# Patient Record
Sex: MALE | Race: BLACK OR AFRICAN AMERICAN | Employment: FULL TIME | URBAN - NONMETROPOLITAN AREA
[De-identification: names, ages, dates, MRNs, and addresses within clinical notes are randomized per-mention and may not be internally consistent; named-entity substitution may affect disease eponyms.]

---

## 2020-06-12 ENCOUNTER — HOSPITAL ENCOUNTER (EMERGENCY)
Age: 30
Discharge: HOME | End: 2020-06-12
Attending: EMERGENCY MEDICINE

## 2020-06-12 VITALS
TEMPERATURE: 97.7 F | DIASTOLIC BLOOD PRESSURE: 74 MMHG | SYSTOLIC BLOOD PRESSURE: 147 MMHG | HEART RATE: 88 BPM | OXYGEN SATURATION: 97 % | WEIGHT: 183 LBS | RESPIRATION RATE: 16 BRPM

## 2020-06-12 PROCEDURE — 87591 N.GONORRHOEAE DNA AMP PROB: CPT

## 2020-06-12 PROCEDURE — 6370000000 HC RX 637 (ALT 250 FOR IP): Performed by: EMERGENCY MEDICINE

## 2020-06-12 PROCEDURE — 6360000002 HC RX W HCPCS: Performed by: EMERGENCY MEDICINE

## 2020-06-12 PROCEDURE — 99203 OFFICE O/P NEW LOW 30 MIN: CPT

## 2020-06-12 PROCEDURE — 2500000003 HC RX 250 WO HCPCS: Performed by: EMERGENCY MEDICINE

## 2020-06-12 PROCEDURE — 99203 OFFICE O/P NEW LOW 30 MIN: CPT | Performed by: EMERGENCY MEDICINE

## 2020-06-12 PROCEDURE — 96372 THER/PROPH/DIAG INJ SC/IM: CPT

## 2020-06-12 PROCEDURE — 87491 CHLMYD TRACH DNA AMP PROBE: CPT

## 2020-06-12 RX ORDER — AZITHROMYCIN 250 MG/1
1000 TABLET, FILM COATED ORAL ONCE
Status: COMPLETED | OUTPATIENT
Start: 2020-06-12 | End: 2020-06-12

## 2020-06-12 RX ADMIN — LIDOCAINE HYDROCHLORIDE 250 MG: 10 INJECTION, SOLUTION EPIDURAL; INFILTRATION; INTRACAUDAL; PERINEURAL at 19:24

## 2020-06-12 RX ADMIN — AZITHROMYCIN MONOHYDRATE 1000 MG: 250 TABLET ORAL at 19:24

## 2020-06-12 ASSESSMENT — ENCOUNTER SYMPTOMS
DIARRHEA: 0
BACK PAIN: 0
EYE REDNESS: 0
SORE THROAT: 0
STRIDOR: 0
VOMITING: 0
WHEEZING: 0
EYE PAIN: 0
VOICE CHANGE: 0
COUGH: 0
SINUS PRESSURE: 0
EYE DISCHARGE: 0
NAUSEA: 0
TROUBLE SWALLOWING: 0
SHORTNESS OF BREATH: 0
ABDOMINAL PAIN: 0

## 2020-06-12 NOTE — ED PROVIDER NOTES
[facility] Encounter      [address] [facility]       Chief Complaint   Patient presents with    Exposure to STD     no ssymptom girlfriend got checked for sx. Nurses Notes reviewed and I agree except as noted in the HPI. HISTORY OF PRESENT ILLNESS   Haydee Ramírez is a 34 y.o. male who presents for evaluation of STD exposure. He is asymptomatic. Patient has had unprotected intercourse with 1 female. She received Rocephin and Zithromax but states she did not have an STD. She is requesting Rodrick Mccoy be tested and treated. Patient denies fever, vomiting, back pain, abdominal pain, dysuria, penile discharge, conjunctivitis, joint pain, rash. Patient states HIV is negative. Smokes cigarettes. Patient requesting follow-up information for health department STD clinic    REVIEW OF SYSTEMS     Review of Systems   Constitutional: Negative for appetite change, chills, fatigue, fever and unexpected weight change. HENT: Negative for congestion, ear discharge, ear pain, sinus pressure, sneezing, sore throat, trouble swallowing and voice change. Eyes: Negative for pain, discharge and redness. Respiratory: Negative for cough, shortness of breath, wheezing and stridor. Cardiovascular: Negative for chest pain and leg swelling. Gastrointestinal: Negative for abdominal pain, diarrhea, nausea and vomiting. Genitourinary: Negative for dysuria, frequency, hematuria and urgency. STD exposure no symptoms   Musculoskeletal: Negative for arthralgias, back pain, myalgias and neck pain. Skin: Negative for rash. Neurological: Negative for dizziness, syncope, weakness and headaches. Hematological: Negative for adenopathy. Psychiatric/Behavioral: Negative for behavioral problems, confusion, sleep disturbance and suicidal ideas. The patient is not nervous/anxious. All other systems reviewed and are negative. PAST MEDICAL HISTORY   History reviewed.  No pertinent past murmur. No friction rub. No gallop. Pulmonary:      Effort: Pulmonary effort is normal. No respiratory distress. Breath sounds: Normal breath sounds. No stridor. No decreased breath sounds, wheezing, rhonchi or rales. Comments: No cough, lungs clear  Chest:      Chest wall: No tenderness. Abdominal:      General: Bowel sounds are normal. There is no distension. Palpations: Abdomen is soft. There is no mass. Tenderness: There is no abdominal tenderness. There is no right CVA tenderness, left CVA tenderness, guarding or rebound. Comments: Soft nontender   Genitourinary:     Comments: Deferred  Musculoskeletal: Normal range of motion. General: No tenderness. Comments: Joints normal   Lymphadenopathy:      Cervical: Cervical adenopathy present. Right cervical: Superficial cervical adenopathy present. No deep or posterior cervical adenopathy. Left cervical: Superficial cervical adenopathy present. No deep or posterior cervical adenopathy. Skin:     General: Skin is warm and dry. Findings: No erythema or rash. Comments: No rash or bruising   Neurological:      Mental Status: He is alert and oriented to person, place, and time. Cranial Nerves: No cranial nerve deficit. Motor: No abnormal muscle tone. Coordination: Coordination normal.      Deep Tendon Reflexes: Reflexes are normal and symmetric. Reflexes normal.      Comments: Appropriate, no focal finding   Psychiatric:         Behavior: Behavior normal.         Thought Content: Thought content normal.         Judgment: Judgment normal.         DIAGNOSTIC RESULTS   Labs: No results found for this visit on 06/12/20.     IMAGING:  No orders to display     URGENT CARE COURSE:     Vitals:    06/12/20 1847   BP: (!) 147/74   Pulse: 88   Resp: 16   Temp: 97.7 °F (36.5 °C)   TempSrc: Temporal   SpO2: 97%   Weight: 183 lb (83 kg)       Medications   cefTRIAXone (ROCEPHIN) 250 mg in lidocaine 1 % 1 mL IM Injection (has no administration in time range)   azithromycin (ZITHROMAX) tablet 1,000 mg (has no administration in time range)   Tolerated well  PROCEDURES:  None  FINALIMPRESSION      1. Exposure to STD        DISPOSITION/PLAN   DISPOSITION    Nontoxic, well-hydrated, normal airway. No sepsis or CNS infection. Abdomen nonsurgical.  Patient asymptomatic. STD testing performed. Patient received Rocephin IM and Zithromax 1000 mg p.o. and tolerated well. Patient to follow-up with health department for further testing and to review test results. He was instructed to maintain sexual abstinence or safe sex practices with written instructions for STIs sent home with patient. PATIENT REFERRED TO:  Follow-up with health department for test results and further testing    Schedule an appointment as soon as possible for a visit in 5 days  Follow-up with health department for test results and further testing    DISCHARGE MEDICATIONS:  New Prescriptions    No medications on file     There are no discharge medications for this patient.       MD Loenard Stewart MD  06/12/20 2001 [address] Flavio Bryan MD  06/12/20 Cyn Reese

## 2020-06-13 LAB
CHLAMYDIA TRACHOMATIS BY RT-PCR: NOT DETECTED
CT/NG SOURCE: NORMAL
NEISSERIA GONORRHOEAE BY RT-PCR: NOT DETECTED

## 2021-06-15 ENCOUNTER — APPOINTMENT (OUTPATIENT)
Dept: GENERAL RADIOLOGY | Age: 31
End: 2021-06-15
Payer: COMMERCIAL

## 2021-06-15 ENCOUNTER — HOSPITAL ENCOUNTER (EMERGENCY)
Age: 31
Discharge: HOME | End: 2021-06-15
Payer: COMMERCIAL

## 2021-06-15 VITALS
OXYGEN SATURATION: 99 % | HEART RATE: 99 BPM | TEMPERATURE: 98.1 F | SYSTOLIC BLOOD PRESSURE: 138 MMHG | WEIGHT: 180 LBS | RESPIRATION RATE: 12 BRPM | DIASTOLIC BLOOD PRESSURE: 91 MMHG

## 2021-06-15 DIAGNOSIS — S42.401A ELBOW FRACTURE, RIGHT, CLOSED, INITIAL ENCOUNTER: Primary | ICD-10-CM

## 2021-06-15 PROCEDURE — 99213 OFFICE O/P EST LOW 20 MIN: CPT | Performed by: EMERGENCY MEDICINE

## 2021-06-15 PROCEDURE — 6370000000 HC RX 637 (ALT 250 FOR IP): Performed by: EMERGENCY MEDICINE

## 2021-06-15 PROCEDURE — 73080 X-RAY EXAM OF ELBOW: CPT

## 2021-06-15 PROCEDURE — 99213 OFFICE O/P EST LOW 20 MIN: CPT

## 2021-06-15 PROCEDURE — 29105 APPLICATION LONG ARM SPLINT: CPT

## 2021-06-15 RX ORDER — IBUPROFEN 200 MG
200 TABLET ORAL EVERY 6 HOURS PRN
COMMUNITY

## 2021-06-15 RX ORDER — IBUPROFEN 800 MG/1
800 TABLET ORAL ONCE
Status: COMPLETED | OUTPATIENT
Start: 2021-06-15 | End: 2021-06-15

## 2021-06-15 RX ORDER — IBUPROFEN 800 MG/1
800 TABLET ORAL EVERY 6 HOURS PRN
Qty: 30 TABLET | Refills: 0 | Status: SHIPPED | OUTPATIENT
Start: 2021-06-15

## 2021-06-15 RX ADMIN — IBUPROFEN 800 MG: 800 TABLET, FILM COATED ORAL at 16:32

## 2021-06-15 ASSESSMENT — ENCOUNTER SYMPTOMS
SHORTNESS OF BREATH: 0
BACK PAIN: 0
COUGH: 0

## 2021-06-15 ASSESSMENT — PAIN SCALES - GENERAL
PAINLEVEL_OUTOF10: 10
PAINLEVEL_OUTOF10: 10

## 2021-06-15 ASSESSMENT — PAIN DESCRIPTION - ORIENTATION: ORIENTATION: RIGHT

## 2021-06-15 ASSESSMENT — PAIN DESCRIPTION - LOCATION: LOCATION: ELBOW

## 2021-06-15 NOTE — ED PROVIDER NOTES
[place] [address]  Urgent Care Encounter       CHIEF COMPLAINT       Chief Complaint   Patient presents with    Fall     C/O RIGHT ELBOW PAIN  AT HOME       Nurses Notes reviewed and I agree except as noted in the HPI. HISTORY OF PRESENT ILLNESS   Manan Mtz is a 27 y.o. male who presents for complaints of right elbow injury. Patient states that he was running and he fell. He landed on his bent elbow. Patient states this hit concrete. Incident happened approximately 3 hours prior to arrival.  Patient states he is having difficulty extending his elbow straight. Pain is on the lateral surface of the elbow and currently rates his pain 10 on a 10 scale. Patient hasn't applied ice or taken anything for his pain. Patient denies any other injuries from today's fall. HPI    REVIEW OF SYSTEMS     Review of Systems   Constitutional: Negative for fatigue and fever. Respiratory: Negative for cough and shortness of breath. Musculoskeletal: Positive for arthralgias. Negative for back pain, gait problem, neck pain and neck stiffness. Skin: Negative for wound. Neurological: Negative for dizziness and light-headedness. Psychiatric/Behavioral: Negative for behavioral problems. PAST MEDICAL HISTORY   No past medical history on file. SURGICALHISTORY     Patient  has no past surgical history on file. CURRENT MEDICATIONS       Previous Medications    IBUPROFEN (ADVIL;MOTRIN) 200 MG TABLET    Take 200 mg by mouth every 6 hours as needed for Pain       ALLERGIES     Patient is has No Known Allergies. Patients   There is no immunization history on file for this patient. FAMILY HISTORY     Patient's family history is not on file. SOCIAL HISTORY     Patient  reports that he quit smoking about 4 months ago. His smoking use included cigarettes. He smoked 0.50 packs per day. He has never used smokeless tobacco. He reports previous alcohol use.  He reports previous drug use.    PHYSICAL EXAM     ED TRIAGE VITALS  BP: (!) 138/91, Temp: 98.1 °F (36.7 °C), Pulse: 99, Resp: 12, SpO2: 99 %,There is no height or weight on file to calculate BMI.,No LMP for male patient. Physical Exam  Constitutional:       General: He is not in acute distress. Appearance: He is normal weight. He is not ill-appearing. HENT:      Head: Normocephalic. Nose: Nose normal.   Cardiovascular:      Rate and Rhythm: Normal rate. Pulses: Normal pulses. Pulmonary:      Effort: Pulmonary effort is normal.      Breath sounds: Normal breath sounds. Musculoskeletal:      Right forearm: Swelling, tenderness and bony tenderness present. Arms:       Comments: Patient has redness on the lateral surface of the right elbow extending from the distal humerus to the proximal forearm. Patient is able to flex the elbow but has difficulty fully extending the elbow. Skin:     General: Skin is warm and dry. Capillary Refill: Capillary refill takes less than 2 seconds. Neurological:      General: No focal deficit present. Mental Status: He is alert and oriented to person, place, and time. Sensory: Sensation is intact. Motor: No abnormal muscle tone. Psychiatric:         Mood and Affect: Mood normal.         Behavior: Behavior normal.         DIAGNOSTIC RESULTS     Labs:No results found for this visit on 06/15/21. IMAGING:    XR ELBOW RIGHT (MIN 3 VIEWS)   Final Result    Questionable joint effusion without definite fracture identified. Joint effusion can be associated with occult radial head fracture. Consider conservative management and repeat imaging in one week. **This report has been created using voice recognition software. It may contain minor errors which are inherent in voice recognition technology. **      Final report electronically signed by Dr. Vida Rush MD on 6/15/2021 4:14 PM            EKG:      URGENT CARE COURSE:     Vitals:    06/15/21 1540   BP: (!) 138/91   Pulse: 99   Resp: 12   Temp: 98.1 °F (36.7 °C)   SpO2: 99%   Weight: 180 lb (81.6 kg)       Medications   ibuprofen (ADVIL;MOTRIN) tablet 800 mg (800 mg Oral Given 6/15/21 1632)            PROCEDURES:  None    FINAL IMPRESSION      1. Elbow fracture, right, closed, initial encounter          DISPOSITION/ PLAN     X-rays were performed of the right elbow and show questionable joint effusion. There is concern for occult radial head fracture. Patient is placed in long-arm posterior splint. An orthopedic appointment is scheduled for tomorrow at 12:50 PM.  He is administered ibuprofen 800 mg in the urgent care. He will be discharged home. Advised to ice the area as frequently as possible. Wear the splint and sling until seen by orthopedics. Ibuprofen as directed as needed for pain. Return for worsening swelling, numbness or tingling to the fingers, delayed capillary refill or any new concerns. PATIENT REFERRED TO:  No primary care provider on file. No primary physician on file.       DISCHARGE MEDICATIONS:  New Prescriptions    IBUPROFEN (ADVIL;MOTRIN) 800 MG TABLET    Take 1 tablet by mouth every 6 hours as needed for Pain       Discontinued Medications    No medications on file       Current Discharge Medication List          KENIA Jon CNP    (Please note that portions of this note were completed with a voice recognition program. Efforts were made to edit the dictations but occasionally words are mis-transcribed.)          KENIA Jon CNP  06/15/21 [id number]

## 2021-06-15 NOTE — ED TRIAGE NOTES
To room 1 judith c/o right elbow pain s/p fall at home. Redness noted. Pt reports he got 15 stitches yeseterday at [facility] ER for  A laceration to buttocks.   Cut on sink in bathroom

## 2022-12-26 ENCOUNTER — HOSPITAL ENCOUNTER (EMERGENCY)
Age: 32
Discharge: HOME | End: 2022-12-26
Attending: STUDENT IN AN ORGANIZED HEALTH CARE EDUCATION/TRAINING PROGRAM
Payer: MEDICAID

## 2022-12-26 VITALS
BODY MASS INDEX: 26.48 KG/M2 | HEART RATE: 89 BPM | RESPIRATION RATE: 14 BRPM | OXYGEN SATURATION: 100 % | TEMPERATURE: 98.2 F | SYSTOLIC BLOOD PRESSURE: 155 MMHG | HEIGHT: 70 IN | DIASTOLIC BLOOD PRESSURE: 79 MMHG | WEIGHT: 185 LBS

## 2022-12-26 DIAGNOSIS — U07.1 COVID-19: Primary | ICD-10-CM

## 2022-12-26 LAB
INFLUENZA A: NOT DETECTED
INFLUENZA B: NOT DETECTED
SARS-COV-2 RNA, RT PCR: DETECTED

## 2022-12-26 PROCEDURE — 87636 SARSCOV2 & INF A&B AMP PRB: CPT

## 2022-12-26 PROCEDURE — 99283 EMERGENCY DEPT VISIT LOW MDM: CPT

## 2022-12-26 ASSESSMENT — ENCOUNTER SYMPTOMS
ABDOMINAL DISTENTION: 0
DIARRHEA: 0
VOICE CHANGE: 0
SORE THROAT: 0
SHORTNESS OF BREATH: 0
NAUSEA: 0
VOMITING: 0
COUGH: 1
TROUBLE SWALLOWING: 0

## 2022-12-26 ASSESSMENT — PAIN - FUNCTIONAL ASSESSMENT: PAIN_FUNCTIONAL_ASSESSMENT: NONE - DENIES PAIN

## 2022-12-26 NOTE — ED PROVIDER NOTES
[facility]          Pt Name: Zainab Herbert  MRN: [id number]  [place] 1990  Date of evaluation: 12/26/2022  Treating Resident Physician: Josh Webster MD  Supervising Physician: Staica Whaley MD    CHIEF COMPLAINT       Chief Complaint   Patient presents with    Pharyngitis     History obtained from the patient. HISTORY OF PRESENT ILLNESS    HPI  Zainab Herbert is a 28 y.o. male with no significant pmhx who presents to the emergency department for evaluation of productive cough. Patient to ED due to chief complaint of 2-day history of productive cough. States that whenever he coughs, he has green sputum. Patient also complaining of a slight tickle in his throat. Denies any sore throat but states that upon awakening he feels a tickle that goes away shortly after. Denies any fever, chills nausea or vomiting or diarrhea. Patient denies any sick contacts. Patient states that he does not feel that sick but his partner made him come to the ED for further evaluation. The patient has no other acute complaints at this time. REVIEW OF SYSTEMS   Review of Systems   Constitutional:  Negative for chills and fever. HENT:  Negative for congestion, sore throat, trouble swallowing and voice change. Respiratory:  Positive for cough. Negative for shortness of breath. Gastrointestinal:  Negative for abdominal distention, diarrhea, nausea and vomiting. Neurological:  Negative for dizziness, light-headedness and headaches. PAST MEDICAL AND SURGICAL HISTORY   History reviewed. No pertinent past medical history. History reviewed. No pertinent surgical history. MEDICATIONS   No current facility-administered medications for this encounter.     Current Outpatient Medications:     ibuprofen (ADVIL;MOTRIN) 200 MG tablet, Take 200 mg by mouth every 6 hours as needed for Pain, Disp: , Rfl:     ibuprofen (ADVIL;MOTRIN) 800 MG tablet, Take 1 tablet by mouth every 6 hours as needed for Pain, Disp: 30 tablet, Rfl: 0    SOCIAL HISTORY     Social History     Social History Narrative    Not on file     Social History     Tobacco Use    Smoking status: Former     Packs/day: 0.50     Types: Cigarettes     Quit date: 1/15/2021     Years since quittin.9    Smokeless tobacco: Never   Vaping Use    Vaping Use: Never used   Substance Use Topics    Alcohol use: Not Currently     Comment: socially    Drug use: Not Currently       ALLERGIES   No Known Allergies    FAMILY HISTORY   History reviewed. No pertinent family history. PREVIOUS RECORDS   Previous records reviewed: I reviewed the patient's past medical records including relevant labs, imaging and procedures. PHYSICAL EXAM     ED Triage Vitals   BP Temp Temp Source Heart Rate Resp SpO2 Height Weight   22 1235 22 1235 22 1235 22 1235 22 1235 22 1235 22 1232 22 1232   (!) 155/79 98.2 °F (36.8 °C) Oral 89 14 100 % 5' 10\" (1.778 m) 185 lb (83.9 kg)     Initial vital signs and nursing assessment reviewed and abnormal from htn . Body mass index is 26.54 kg/m². Pulsoximetry is normal per my interpretation. Additional Vital Signs:  Vitals:    22 1235   BP: (!) 155/79   Pulse: 89   Resp: 14   Temp: 98.2 °F (36.8 °C)   SpO2: 100%       Physical Exam  Constitutional:       Appearance: He is well-developed and normal weight. HENT:      Head: Normocephalic and atraumatic. Nose: No congestion or rhinorrhea. Eyes:      Conjunctiva/sclera: Conjunctivae normal.      Pupils: Pupils are equal, round, and reactive to light. Cardiovascular:      Rate and Rhythm: Normal rate and regular rhythm. Heart sounds: Normal heart sounds. Pulmonary:      Effort: Pulmonary effort is normal.      Breath sounds: Normal breath sounds. Skin:     General: Skin is warm and dry. Neurological:      General: No focal deficit present.       Mental Status: He is alert and oriented to person, place, and time. Psychiatric:         Mood and Affect: Mood normal.         Behavior: Behavior normal.     ED RESULTS   Laboratory results:  Labs Reviewed   COVID-19 & INFLUENZA COMBO - Abnormal; Notable for the following components:       Result Value    SARS-CoV-2 RNA, RT PCR DETECTED (*)     All other components within normal limits       Radiologic studies results:  No orders to display       ED Medications administered this visit: Medications - No data to display    ED COURSE     ED Course as of 12/26/22 1352   Mon Dec 26, 2022   1345 SARS-CoV-2 RNA, RT PCR(!!): DETECTED [EL]      ED Course User Index  [EL] Nabil Wiggins MD       MEDICAL DECISION MAKING   Given the patient's above chief complaint and findings on history and physical examination, I thought it was appropriate to consider the following emergency medical conditions:  COVID, flu, viral illness, pneumonia    Although some of these diagnoses are unlikely, they were considered in my medical decision making. Well-appearing 35-year-old male chief complaint cough x2 days. Productive sputum. Physical exam shows good breath sounds, no crackles or rales. COVID flu swabs were obtained, COVID positive. SPO2 100% on room air. Vital signs stable. At this time, patient is stable for discharge with follow-up to PCP. Strict return precautions and follow up instructions were discussed with the patient prior to discharge, with which the patient agrees. MEDICATION CHANGES     New Prescriptions    No medications on file       FINAL DISPOSITION     Final diagnoses:   COVID-19     Condition: condition: stable  Dispo: Discharge to home    This transcription was electronically signed. Parts of this transcriptions may have been dictated by use of voice recognition software and electronically transcribed, and parts may have been transcribed with the assistance of an ED scribe.  The transcription may contain errors not detected in proofreading. Please refer to my supervising physician's documentation if my documentation differs.     Electronically Signed: Joslyn Nelson MD, 12/26/22, 1:52 PM         Toshia Pineda MD  Resident  12/26/22 2367

## 2022-12-26 NOTE — ED TRIAGE NOTES
Pt comes to ED with c/o sore throat and congestion that started 2 days ago. Pt states that he wanted to come to ER to get checked out before going to work. Pt does not appear to be in distress at this time.

## 2022-12-26 NOTE — Clinical Note
Monique Ashraf was seen and treated in our emergency department on 12/26/2022. He may return to work on 12/29/2022. If you have any questions or concerns, please don't hesitate to call.       Fadi Phelps MD

## 2023-01-15 ENCOUNTER — APPOINTMENT (OUTPATIENT)
Dept: GENERAL RADIOLOGY | Age: 33
End: 2023-01-15
Payer: MEDICAID

## 2023-01-15 ENCOUNTER — HOSPITAL ENCOUNTER (EMERGENCY)
Age: 33
Discharge: HOME | End: 2023-01-15
Attending: STUDENT IN AN ORGANIZED HEALTH CARE EDUCATION/TRAINING PROGRAM
Payer: MEDICAID

## 2023-01-15 VITALS
SYSTOLIC BLOOD PRESSURE: 139 MMHG | RESPIRATION RATE: 18 BRPM | DIASTOLIC BLOOD PRESSURE: 73 MMHG | HEART RATE: 101 BPM | OXYGEN SATURATION: 97 % | TEMPERATURE: 98 F
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
## 2023-01-15 DIAGNOSIS — Z23 NEED FOR PROPHYLACTIC VACCINATION AND INOCULATION AGAINST RABIES: ICD-10-CM

## 2023-01-15 PROCEDURE — 73090 X-RAY EXAM OF FOREARM: CPT

## 2023-01-15 PROCEDURE — 90471 IMMUNIZATION ADMIN: CPT | Performed by: STUDENT IN AN ORGANIZED HEALTH CARE EDUCATION/TRAINING PROGRAM

## 2023-01-15 PROCEDURE — 90675 RABIES VACCINE IM: CPT | Performed by: STUDENT IN AN ORGANIZED HEALTH CARE EDUCATION/TRAINING PROGRAM

## 2023-01-15 PROCEDURE — 6370000000 HC RX 637 (ALT 250 FOR IP): Performed by: STUDENT IN AN ORGANIZED HEALTH CARE EDUCATION/TRAINING PROGRAM

## 2023-01-15 PROCEDURE — 6360000002 HC RX W HCPCS: Performed by: STUDENT IN AN ORGANIZED HEALTH CARE EDUCATION/TRAINING PROGRAM

## 2023-01-15 PROCEDURE — 99284 EMERGENCY DEPT VISIT MOD MDM: CPT

## 2023-01-15 RX ORDER — AMOXICILLIN AND CLAVULANATE POTASSIUM 875; 125 MG/1; MG/1
1 TABLET, FILM COATED ORAL ONCE
Status: COMPLETED | OUTPATIENT
Start: 2023-01-15 | End: 2023-01-15

## 2023-01-15 RX ORDER — AMOXICILLIN AND CLAVULANATE POTASSIUM 875; 125 MG/1; MG/1
1 TABLET, FILM COATED ORAL 2 TIMES DAILY
Qty: 6 TABLET | Refills: 0 | Status: SHIPPED | OUTPATIENT
Start: 2023-01-15 | End: 2023-01-18

## 2023-01-15 RX ADMIN — RABIES VACCINE 1 ML: KIT at 14:33

## 2023-01-15 RX ADMIN — AMOXICILLIN AND CLAVULANATE POTASSIUM 1 TABLET: 875; 125 TABLET, FILM COATED ORAL at 15:02

## 2023-01-15 ASSESSMENT — PAIN - FUNCTIONAL ASSESSMENT: PAIN_FUNCTIONAL_ASSESSMENT: 0-10

## 2023-01-15 ASSESSMENT — PAIN SCALES - GENERAL: PAINLEVEL_OUTOF10: 8

## 2023-01-15 NOTE — ED PROVIDER NOTES
[facility] [address] [address] EMERGENCY DEPT      EMERGENCY MEDICINE     Pt Name: Jacqueline Shearer  MRN: [id number]  [place] 1990  Date of evaluation: 1/15/2023  Provider: Evan Peraza DO    CHIEF COMPLAINT       Chief Complaint   Patient presents with    Animal Bite     HISTORY OF PRESENT ILLNESS   Jacqueline Shearer is a pleasant 28 y.o. male who presents to the emergency department from from home, as a walk in to the ED lobby for evaluation of dog bite. Patient reports he was walking out about when he was attacked by people. Patient reports bites to his right and left arms. Patient reports no other injuries. Patient reports no other significant acute complaints. Patient reports up-to-date on tetanus. Patient is concerned about rabies since he does not know the vaccination status of the dog or how to possibly find the dog. Patient reports pain in his arms. Patient has no other acute complaints this time. Peggy Gilbert PASTMEDICAL HISTORY   No past medical history on file. There is no problem list on file for this patient. SURGICAL HISTORY     No past surgical history on file. CURRENT MEDICATIONS       Discharge Medication List as of 1/15/2023  3:29 PM        CONTINUE these medications which have NOT CHANGED    Details   !! ibuprofen (ADVIL;MOTRIN) 200 MG tablet Take 200 mg by mouth every 6 hours as needed for PainHistorical Med      !! ibuprofen (ADVIL;MOTRIN) 800 MG tablet Take 1 tablet by mouth every 6 hours as needed for Pain, Disp-30 tablet, R-0Normal       !! - Potential duplicate medications found. Please discuss with provider. ALLERGIES     has No Known Allergies. FAMILY HISTORY     He indicated that his mother is alive. He indicated that his father is alive.        SOCIAL HISTORY       Social History     Tobacco Use    Smoking status: Former     Packs/day: 0.50     Types: Cigarettes     Quit date: 1/15/2021     Years since quittin.0    Smokeless tobacco: Never   Vaping Use    Vaping Use: Never used Substance Use Topics    Alcohol use: Not Currently     Comment: socially    Drug use: Not Currently       PHYSICAL EXAM       ED Triage Vitals [01/15/23 1321]   BP Temp Temp Source Heart Rate Resp SpO2 Height Weight   139/73 98 °F (36.7 °C) Oral (!) 101 18 97 % -- --       Additional Vital Signs:  Vitals:    01/15/23 1321   BP: 139/73   Pulse: (!) 101   Resp: 18   Temp: 98 °F (36.7 °C)   SpO2: 97%     Physical Exam  Constitutional:       General: He is not in acute distress. Appearance: Normal appearance. He is normal weight. He is not ill-appearing or toxic-appearing. HENT:      Head: Normocephalic and atraumatic. Right Ear: External ear normal.      Left Ear: External ear normal.      Nose: Nose normal.      Mouth/Throat:      Mouth: Mucous membranes are moist.      Pharynx: Oropharynx is clear. Eyes:      Extraocular Movements: Extraocular movements intact. Pupils: Pupils are equal, round, and reactive to light. Cardiovascular:      Rate and Rhythm: Normal rate and regular rhythm. Pulmonary:      Effort: Pulmonary effort is normal.      Breath sounds: Normal breath sounds. Abdominal:      General: Abdomen is flat. Palpations: Abdomen is soft. Musculoskeletal:         General: Normal range of motion. Right lower leg: No edema. Left lower leg: No edema. Comments: Multiple lacerations noted to the volar and dorsal aspect of the right forearm. Largest laceration is approximately 1 cm in nature. Singular laceration noted to the left volar aspect of the forearm. Approximately 1 cm. Patient has intact flexion and extension of all 5 digits on both hands. Patient has flexion extension and wrist in bilateral arms. Skin:     General: Skin is warm and dry. Capillary Refill: Capillary refill takes less than 2 seconds. Neurological:      General: No focal deficit present. Mental Status: He is alert and oriented to person, place, and time.        FORMAL DIAGNOSTIC RESULTS     RADIOLOGY: Interpretation per the Radiologist below, if available at the time of this note (none if blank):    XR RADIUS ULNA LEFT (2 VIEWS)   Final Result      No fracture, dislocation or radiopaque foreign body. Final report electronically signed by Dr. Gricel Hutchinson on 1/15/2023 2:38 PM      XR RADIUS ULNA RIGHT (2 VIEWS)   Final Result      No fracture, dislocation or radiopaque foreign body. Final report electronically signed by Dr. Gricel Hutchinson on 1/15/2023 2:38 PM          LABS: (none if blank)  Labs Reviewed - No data to display    (Any cultures that may have been sent were not resulted at the time of this patient visit)    [address] / ED COURSE:     1) Number and Complexity of Problems            Problem List This Visit:         Chief Complaint   Patient presents with    Animal Bite            Differential Diagnosis includes (but not limited to):  Retained foreign body        Diagnoses Considered but I have low suspicion of:   Tendon injury             Pertinent Comorbid Conditions:    None    2)  Data Reviewed (none if left blank)          My Independent interpretations:     EKG:      None    Imaging: X-ray bilateral forearms not show radiopaque foreign body    Labs:      None                 Decision Rules/Clinical Scores utilized: None            External Documentation Reviewed:         Previous patient encounter documents & history available on EMR was reviewed              See Formal Diagnostic Results above for the lab and radiology tests and orders.     3)  Treatment and Disposition         ED Reassessment: Stable         Case discussed with consulting clinician:           Shared Decision-Making was performed and disposition discussed with the        Patient/Family and questions answered          Social determinants of health impacting treatment or disposition:           Code Status: Full      Summary of Patient Presentation:      MDM  Number of Diagnoses or Management Options  Dog bite, initial encounter: new, needed workup  Need for prophylactic vaccination and inoculation against rabies: new, needed workup     Amount and/or Complexity of Data Reviewed  Tests in the radiology section of CPT®: ordered and reviewed    Risk of Complications, Morbidity, and/or Mortality  Presenting problems: low  Diagnostic procedures: minimal  Management options: minimal  General comments: X-rays of the bilateral forearms not show any radiopaque foreign bodies. Is felt this excludes any sort of retained dog tooth. Wounds were washed in the emergency department then bandaged. Patient was given his first dose of Augmentin in the emergency department for prophylaxis. Patient will be discharged home with 3 days of Augmentin for prophylaxis. Patient given first dose of rabies vaccination in the emergency department and was provided with outpatient orders so that he could follow-up and get the remainder of his series. Due to the [place] not having an active rabies case and a canine within the last 50 years, is felt that the patient did not need immunoglobulin in the emergency department. Patient Progress  Patient progress: stable  /   Vitals Reviewed:    Vitals:    01/15/23 1321   BP: 139/73   Pulse: (!) 101   Resp: 18   Temp: 98 °F (36.7 °C)   TempSrc: Oral   SpO2: 97%       The patient was seen and examined. Appropriate diagnostic testing was performed and results reviewed with the patient. The results of pertinent diagnostic studies and exam findings were discussed. The patients provisional diagnosis and plan of care were discussed with the patient and present family who expressed understanding. Any medications were reviewed and indications and risks of medications were discussed with the patient /family present. Strict verbal and written return precautions, instructions and appropriate follow-up provided to  the patient .      ED Medications administered this visit:  (None if blank)  Medications   rabies vaccine, PCEC (RABAVERT) injection 1 mL (1 mL IntraMUSCular Given 1/15/23 1433)   amoxicillin-clavulanate (AUGMENTIN) 875-125 MG per tablet 1 tablet (1 tablet Oral Given 1/15/23 1502)         PROCEDURES: (None if blank)  Procedures:     CRITICAL CARE: (None if blank)      DISCHARGE PRESCRIPTIONS: (None if blank)  Discharge Medication List as of 1/15/2023  3:29 PM        START taking these medications    Details   amoxicillin-clavulanate (AUGMENTIN) 875-125 MG per tablet Take 1 tablet by mouth 2 times daily for 3 days, Disp-6 tablet, R-0Print             FINAL IMPRESSION      1. Dog bite, initial encounter    2. Need for prophylactic vaccination and inoculation against rabies          DISPOSITION/PLAN   DISPOSITION Decision To Discharge 01/15/2023 03:15:10 PM      OUTPATIENT FOLLOW UP THE PATIENT:  No follow-up provider specified.     Nettie Hicks, [address] [facility],   01/15/23 2040

## 2023-01-15 NOTE — DISCHARGE INSTRUCTIONS
A regimen of four 1-mL doses of HDCV or PCEC vaccines should be administered intramuscularly to previously unvaccinated persons. The first dose of the four-dose course should be administered as soon as possible after exposure. Additional doses should be administered on days 3, 7, and 14 after the first vaccination. For adults, the vaccination should always be administered intramuscularly in the deltoid area (arm).

## 2023-01-15 NOTE — ED TRIAGE NOTES
Pt presents to the ED with c/o animal bite. Pt reports he got bit by a dog right before coming here. Upon arrival bleeding is controlled. Pt has bite marks mostly on the right wrist and arm with one puncture on the left. Pt rates pain 8/10 at this angie.e
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
## 2023-01-16 RX ORDER — ONDANSETRON 2 MG/ML
8 INJECTION INTRAMUSCULAR; INTRAVENOUS
OUTPATIENT
Start: 2023-01-16

## 2023-01-16 RX ORDER — ACETAMINOPHEN 325 MG/1
650 TABLET ORAL
OUTPATIENT
Start: 2023-01-16

## 2023-01-16 RX ORDER — SODIUM CHLORIDE 9 MG/ML
INJECTION, SOLUTION INTRAVENOUS CONTINUOUS
OUTPATIENT
Start: 2023-01-16

## 2023-01-16 RX ORDER — DIPHENHYDRAMINE HYDROCHLORIDE 50 MG/ML
50 INJECTION INTRAMUSCULAR; INTRAVENOUS
OUTPATIENT
Start: 2023-01-16

## 2023-01-16 RX ORDER — ALBUTEROL SULFATE 90 UG/1
4 AEROSOL, METERED RESPIRATORY (INHALATION) PRN
OUTPATIENT
Start: 2023-01-16

## 2023-03-18 ENCOUNTER — HOSPITAL ENCOUNTER (EMERGENCY)
Age: 33
Discharge: HOME | End: 2023-03-18
Payer: MEDICAID

## 2023-03-18 VITALS
HEART RATE: 109 BPM | DIASTOLIC BLOOD PRESSURE: 82 MMHG | SYSTOLIC BLOOD PRESSURE: 146 MMHG | OXYGEN SATURATION: 100 % | TEMPERATURE: 99.7 F | RESPIRATION RATE: 14 BRPM

## 2023-03-18 DIAGNOSIS — L73.9 HAIR FOLLICLE INFECTION: Primary | ICD-10-CM

## 2023-03-18 DIAGNOSIS — R05.1 ACUTE COUGH: ICD-10-CM

## 2023-03-18 PROCEDURE — 99283 EMERGENCY DEPT VISIT LOW MDM: CPT

## 2023-03-18 RX ORDER — BENZONATATE 100 MG/1
100 CAPSULE ORAL 3 TIMES DAILY PRN
Qty: 30 CAPSULE | Refills: 0 | Status: SHIPPED | OUTPATIENT
Start: 2023-03-18 | End: 2023-03-25

## 2023-03-18 RX ORDER — MUPIROCIN CALCIUM 20 MG/G
CREAM TOPICAL
Qty: 15 G | Refills: 0 | Status: SHIPPED | OUTPATIENT
Start: 2023-03-18 | End: 2023-04-17

## 2023-03-18 NOTE — ED NOTES
Pt to the ED via self. Patient presents with complaints of an abscess on his pubic area. Patient states that it just appeared. Patient is alert and oriented x 4. Respirations are regular and unlabored. Call light within reach.      Sowmya Weinberg RN  03/18/23 0885

## 2023-03-19 NOTE — ED PROVIDER NOTES
[facility] Emergency Department    CHIEF COMPLAINT       Chief Complaint   Patient presents with    Abscess       Nurses Notes reviewed and I agree except as noted in the HPI. HISTORY OF PRESENT ILLNESS    Alexandre Briscoe is a 28 y.o. male who presents to the ED for evaluation of abscess. Patient notes abnormal area to the right groin. Notes been present for the last couple of days. He denies any drainage from the area. He denies any similar abscesses in the past.  Denies fevers or chills. He notes he has had an intermittent cough, but denies any production of the cough. He denies any significant past medical history. HPI was provided by the patient. PAST MEDICAL HISTORY   No past medical history on file. SURGICALHISTORY      has no past surgical history on file. CURRENT MEDICATIONS       Discharge Medication List as of 3/18/2023  3:16 PM        CONTINUE these medications which have NOT CHANGED    Details   !! ibuprofen (ADVIL;MOTRIN) 200 MG tablet Take 200 mg by mouth every 6 hours as needed for PainHistorical Med      !! ibuprofen (ADVIL;MOTRIN) 800 MG tablet Take 1 tablet by mouth every 6 hours as needed for Pain, Disp-30 tablet, R-0Normal       !! - Potential duplicate medications found. Please discuss with provider. ALLERGIES     has No Known Allergies. FAMILY HISTORY     He indicated that his mother is alive. He indicated that his father is alive. family history is not on file.     SOCIAL HISTORY       Social History     Socioeconomic History    Marital status: Single     Spouse name: Not on file    Number of children: Not on file    Years of education: Not on file    Highest education level: Not on file   Occupational History    Not on file   Tobacco Use    Smoking status: Former     Packs/day: 0.50     Types: Cigarettes     Quit date: 1/15/2021     Years since quittin.1    Smokeless tobacco: Never   Vaping Use    Vaping Use: Never used   Substance and Sexual Activity    Alcohol use: Not Currently     Comment: socially    Drug use: Not Currently    Sexual activity: Not on file   Other Topics Concern    Not on file   Social History Narrative    Not on file     Social Determinants of Health     Financial Resource Strain: Not on file   Food Insecurity: Not on file   Transportation Needs: Not on file   Physical Activity: Not on file   Stress: Not on file   Social Connections: Not on file   Intimate Partner Violence: Not on file   Housing Stability: Not on file       PHYSICAL EXAM     INITIAL VITALS:  oral temperature is 99.7 °F (37.6 °C). His blood pressure is 146/82 (abnormal) and his pulse is 109 (abnormal). His respiration is 14 and oxygen saturation is 100%. Physical Exam  Vitals and nursing note reviewed. Constitutional:       General: He is not in acute distress. Appearance: Normal appearance. He is normal weight. He is not ill-appearing or toxic-appearing. HENT:      Nose: Nose normal.   Cardiovascular:      Rate and Rhythm: Normal rate. Pulses: Normal pulses. Pulmonary:      Effort: Pulmonary effort is normal.   Abdominal:      General: Abdomen is flat. Palpations: Abdomen is soft. Skin:     Comments: Small furuncle noted to the right groin   Neurological:      General: No focal deficit present. Mental Status: He is alert. Psychiatric:         Mood and Affect: Mood normal.         Behavior: Behavior normal.       DIFFERENTIAL DIAGNOSIS:   Abscess, folliculitis, bronchitis, viral URI    DIAGNOSTIC RESULTS         RADIOLOGY: non-plainfilm images(s) such as CT, Ultrasound and MRI are read by the radiologist.  Plain radiographic images are visualized and preliminarily interpreted by the emergency physician unless otherwise stated below.   No orders to display         LABS:   Labs Reviewed - No data to display    EMERGENCY DEPARTMENT COURSE:   Vitals:    Vitals:    03/18/23 1432   BP: (!) 146/82   Pulse: (!) 109   Resp: 14   Temp: 99.7 °F (37.6 °C)   TempSrc: Oral   SpO2: 100%       MDM  Patient was seen and evaluated in the emergency department, patient appears to be in no acute distress, vital signs reviewed, no significant findings are noted. Physical exam completed, there is small furuncle in the right groin, there is no significant erythema surrounding it, there is no significant fluctuance. Did not feel this is adequate to drain at this time. Patient also complains of a small cough, lungs are clear to auscultation. I discussed my findings and plan of care the patient is amenable with discharge. He is advised apply Bactroban, use warm compress, use Tessalon Perles for cough. Patient verbalized understanding of plan of care. Medications - No data to display    Patient was seenindependently by myself. The patient's final impression and disposition and plan was determined by myself. CRITICAL CARE:   None    CONSULTS:  None    PROCEDURES:  None    FINAL IMPRESSION     1. Hair follicle infection    2. Acute cough          DISPOSITION/PLAN   Patient was discharged    PATIENT REFERREDTO:  [address] [place] [address],[address] [place] [address]  Call   For follow up and evaluation, If symptoms worsen      DISCHARGE MEDICATIONS:  Discharge Medication List as of 3/18/2023  3:16 PM        START taking these medications    Details   mupirocin (BACTROBAN) 2 % cream Apply topically 3 times daily. , Disp-15 g, R-0, Print      benzonatate (TESSALON PERLES) 100 MG capsule Take 1 capsule by mouth 3 times daily as needed for Cough, Disp-30 capsule, R-0Print             (Please note that portions of this note were completed with a voice recognition program.  Efforts were made to edit the dictations but occasionally words are mis-transcribed.)        Provider:  I personally performed the services described in the documentation,reviewed and edited the documentation which was dictated to the scribe in my presence, and it accurately records my words and actions.     Colin Armstrong, CNP 03/19/23 12:09 PM    Clementine Nolasco Counts, APRN - CNP         [employer], APRN - CNP  03/19/23 5892

## 2023-06-17 ENCOUNTER — HOSPITAL ENCOUNTER (EMERGENCY)
Age: 33
Discharge: HOME | End: 2023-06-17
Payer: MEDICAID

## 2023-06-17 VITALS
WEIGHT: 185 LBS | HEART RATE: 100 BPM | SYSTOLIC BLOOD PRESSURE: 152 MMHG | OXYGEN SATURATION: 96 % | BODY MASS INDEX: 26.54 KG/M2 | TEMPERATURE: 98.4 F | DIASTOLIC BLOOD PRESSURE: 80 MMHG | RESPIRATION RATE: 18 BRPM

## 2023-06-17 DIAGNOSIS — S41.152A DOG BITE OF ARM, LEFT, INITIAL ENCOUNTER: Primary | ICD-10-CM
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
## 2023-06-17 PROCEDURE — 12001 RPR S/N/AX/GEN/TRNK 2.5CM/<: CPT

## 2023-06-17 PROCEDURE — 99284 EMERGENCY DEPT VISIT MOD MDM: CPT

## 2023-06-17 PROCEDURE — 90375 RABIES IG IM/SC: CPT | Performed by: PHYSICIAN ASSISTANT

## 2023-06-17 PROCEDURE — 90471 IMMUNIZATION ADMIN: CPT | Performed by: PHYSICIAN ASSISTANT

## 2023-06-17 PROCEDURE — 96372 THER/PROPH/DIAG INJ SC/IM: CPT

## 2023-06-17 PROCEDURE — 90675 RABIES VACCINE IM: CPT | Performed by: PHYSICIAN ASSISTANT

## 2023-06-17 PROCEDURE — 6360000002 HC RX W HCPCS: Performed by: PHYSICIAN ASSISTANT

## 2023-06-17 PROCEDURE — 6370000000 HC RX 637 (ALT 250 FOR IP): Performed by: PHYSICIAN ASSISTANT

## 2023-06-17 RX ORDER — IBUPROFEN 800 MG/1
800 TABLET ORAL ONCE
Status: COMPLETED | OUTPATIENT
Start: 2023-06-17 | End: 2023-06-17

## 2023-06-17 RX ORDER — IBUPROFEN 800 MG/1
800 TABLET ORAL EVERY 6 HOURS PRN
Qty: 15 TABLET | Refills: 0 | Status: SHIPPED | OUTPATIENT
Start: 2023-06-17

## 2023-06-17 RX ORDER — AMOXICILLIN AND CLAVULANATE POTASSIUM 875; 125 MG/1; MG/1
1 TABLET, FILM COATED ORAL 2 TIMES DAILY
Qty: 14 TABLET | Refills: 0 | Status: SHIPPED | OUTPATIENT
Start: 2023-06-17 | End: 2023-06-24

## 2023-06-17 RX ORDER — AMOXICILLIN AND CLAVULANATE POTASSIUM 875; 125 MG/1; MG/1
1 TABLET, FILM COATED ORAL ONCE
Status: COMPLETED | OUTPATIENT
Start: 2023-06-17 | End: 2023-06-17

## 2023-06-17 RX ADMIN — AMOXICILLIN AND CLAVULANATE POTASSIUM 1 TABLET: 875; 125 TABLET, FILM COATED ORAL at 22:27

## 2023-06-17 RX ADMIN — RABIES IMMUNE GLOBULIN (HUMAN) 1500 UNITS: 300 INJECTION, SOLUTION INFILTRATION; INTRAMUSCULAR at 22:03

## 2023-06-17 RX ADMIN — RABIES IMMUNE GLOBULIN (HUMAN) 180 UNITS: 300 INJECTION, SOLUTION INFILTRATION; INTRAMUSCULAR at 22:12

## 2023-06-17 RX ADMIN — IBUPROFEN 800 MG: 800 TABLET, FILM COATED ORAL at 22:27

## 2023-06-17 RX ADMIN — RABIES VACCINE 1 ML: KIT at 22:16

## 2023-06-17 ASSESSMENT — ENCOUNTER SYMPTOMS
SHORTNESS OF BREATH: 0
PHOTOPHOBIA: 0
NAUSEA: 0
VOMITING: 0

## 2023-06-17 ASSESSMENT — PAIN - FUNCTIONAL ASSESSMENT: PAIN_FUNCTIONAL_ASSESSMENT: 0-10

## 2023-06-19 RX ORDER — DIPHENHYDRAMINE HYDROCHLORIDE 50 MG/ML
50 INJECTION INTRAMUSCULAR; INTRAVENOUS
OUTPATIENT
Start: 2023-06-19

## 2023-06-19 RX ORDER — SODIUM CHLORIDE 9 MG/ML
INJECTION, SOLUTION INTRAVENOUS CONTINUOUS
OUTPATIENT
Start: 2023-06-19

## 2023-06-19 RX ORDER — EPINEPHRINE 1 MG/ML
0.3 INJECTION, SOLUTION INTRAMUSCULAR; SUBCUTANEOUS PRN
OUTPATIENT
Start: 2023-06-19